# Patient Record
Sex: MALE | Race: OTHER | NOT HISPANIC OR LATINO
[De-identification: names, ages, dates, MRNs, and addresses within clinical notes are randomized per-mention and may not be internally consistent; named-entity substitution may affect disease eponyms.]

---

## 2021-11-30 ENCOUNTER — APPOINTMENT (OUTPATIENT)
Dept: SURGERY | Facility: CLINIC | Age: 61
End: 2021-11-30

## 2022-08-30 ENCOUNTER — APPOINTMENT (OUTPATIENT)
Dept: ORTHOPEDIC SURGERY | Facility: CLINIC | Age: 62
End: 2022-08-30

## 2022-08-30 ENCOUNTER — RESULT CHARGE (OUTPATIENT)
Age: 62
End: 2022-08-30

## 2022-08-30 VITALS — BODY MASS INDEX: 38.8 KG/M2 | WEIGHT: 262 LBS | HEIGHT: 69 IN

## 2022-08-30 DIAGNOSIS — E11.9 TYPE 2 DIABETES MELLITUS W/OUT COMPLICATIONS: ICD-10-CM

## 2022-08-30 DIAGNOSIS — Z78.9 OTHER SPECIFIED HEALTH STATUS: ICD-10-CM

## 2022-08-30 DIAGNOSIS — M70.22 OLECRANON BURSITIS, LEFT ELBOW: ICD-10-CM

## 2022-08-30 DIAGNOSIS — I10 ESSENTIAL (PRIMARY) HYPERTENSION: ICD-10-CM

## 2022-08-30 PROCEDURE — 73080 X-RAY EXAM OF ELBOW: CPT | Mod: LT

## 2022-08-30 PROCEDURE — 99203 OFFICE O/P NEW LOW 30 MIN: CPT

## 2022-08-30 PROCEDURE — ZZZZZ: CPT

## 2022-08-30 NOTE — HISTORY OF PRESENT ILLNESS
[0] : 0 [Full time] : Work status: full time [FreeTextEntry3] : June 2022 [FreeTextEntry5] : Patient had lt shoulder surgery in June 2022. Since then, he has been having swelling in the lt elbow. No pain [de-identified] : 63 y/o RHD male here today for the L shoulder.  He noticed swelling in the elbow about 2 weeks ago.  No injury.  No fevers or chills.  No treatment.  [] : no [FreeTextEntry1] : L elbow [de-identified] : 6/6/22

## 2022-08-30 NOTE — PHYSICAL EXAM
[Left] : left elbow [NL (150)] : flexion 150 degrees [NL (0)] : extension 0 degrees [5___] : extension 5[unfilled]/5 [] : no erythema

## 2022-08-30 NOTE — REVIEW OF SYSTEMS
[Joint Stiffness] : joint stiffness [Joint Swelling] : joint swelling [Joint Pain] : joint pain [Negative] : Heme/Lymph
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
## 2022-08-30 NOTE — HISTORY OF PRESENT ILLNESS
[0] : 0 [Full time] : Work status: full time [FreeTextEntry3] : June 2022 [FreeTextEntry5] : Patient had lt shoulder surgery in June 2022. Since then, he has been having swelling in the lt elbow. No pain [de-identified] : 63 y/o RHD male here today for the L shoulder.  He noticed swelling in the elbow about 2 weeks ago.  No injury.  No fevers or chills.  No treatment.  [] : no [FreeTextEntry1] : L elbow [de-identified] : 6/6/22

## 2022-08-30 NOTE — ASSESSMENT
[FreeTextEntry1] : L olecranon bursitis, non infected. \par Ice.\par Diclofenac BId prn.\par Compressive wrapping. \par RTO 6 weeks prn.

## 2022-12-18 ENCOUNTER — EMERGENCY (EMERGENCY)
Facility: HOSPITAL | Age: 62
LOS: 1 days | Discharge: HOME | End: 2022-12-18
Attending: EMERGENCY MEDICINE
Payer: COMMERCIAL

## 2022-12-18 VITALS
HEIGHT: 69 IN | HEART RATE: 88 BPM | DIASTOLIC BLOOD PRESSURE: 99 MMHG | WEIGHT: 261.91 LBS | OXYGEN SATURATION: 99 % | SYSTOLIC BLOOD PRESSURE: 156 MMHG | RESPIRATION RATE: 20 BRPM | TEMPERATURE: 98 F

## 2022-12-18 PROCEDURE — 93005 ELECTROCARDIOGRAM TRACING: CPT

## 2022-12-18 PROCEDURE — 99284 EMERGENCY DEPT VISIT MOD MDM: CPT

## 2022-12-18 PROCEDURE — 72131 CT LUMBAR SPINE W/O DYE: CPT | Mod: MA

## 2022-12-18 PROCEDURE — 73130 X-RAY EXAM OF HAND: CPT

## 2022-12-18 PROCEDURE — 72131 CT LUMBAR SPINE W/O DYE: CPT | Mod: 26,MA

## 2022-12-18 PROCEDURE — 99285 EMERGENCY DEPT VISIT HI MDM: CPT | Mod: 25

## 2022-12-18 PROCEDURE — 93010 ELECTROCARDIOGRAM REPORT: CPT

## 2022-12-18 PROCEDURE — 73130 X-RAY EXAM OF HAND: CPT | Mod: 26,LT

## 2022-12-18 RX ORDER — METHOCARBAMOL 500 MG/1
2 TABLET, FILM COATED ORAL
Qty: 30 | Refills: 0
Start: 2022-12-18 | End: 2022-12-22

## 2022-12-18 RX ORDER — METHOCARBAMOL 500 MG/1
1500 TABLET, FILM COATED ORAL ONCE
Refills: 0 | Status: COMPLETED | OUTPATIENT
Start: 2022-12-18 | End: 2022-12-18

## 2022-12-18 RX ORDER — IBUPROFEN 200 MG
1 TABLET ORAL
Qty: 20 | Refills: 0
Start: 2022-12-18 | End: 2022-12-22

## 2022-12-18 RX ORDER — IBUPROFEN 200 MG
600 TABLET ORAL ONCE
Refills: 0 | Status: COMPLETED | OUTPATIENT
Start: 2022-12-18 | End: 2022-12-18

## 2022-12-18 RX ADMIN — METHOCARBAMOL 1500 MILLIGRAM(S): 500 TABLET, FILM COATED ORAL at 15:46

## 2022-12-18 RX ADMIN — Medication 600 MILLIGRAM(S): at 15:46

## 2022-12-18 NOTE — ED PROVIDER NOTE - NS ED ATTENDING STATEMENT MOD
This was a shared visit with the DAREN. I reviewed and verified the documentation and independently performed the documented:
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
## 2022-12-18 NOTE — ED PROVIDER NOTE - PATIENT PORTAL LINK FT
You can access the FollowMyHealth Patient Portal offered by [facility] by registering at the following website: [email or web address]. By joining [facility] FollowMyHealth portal, you will also be able to view your health information using other applications (apps) compatible with our system.
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
## 2022-12-18 NOTE — ED PROVIDER NOTE - NSFOLLOWUPCLINICS_GEN_ALL_ED_FT
[facility] - [facility]  Ortho/Spine  [address] [place] [address]  Phone: [phone]  Fax:

## 2022-12-19 NOTE — ED ADULT NURSE REASSESSMENT NOTE - NS ED NURSE REASSESS COMMENT FT1
Assumed care of pt at 19:15 as stated in report from KRISTIN Fenton. Charting as noted. Patient A&O x4, denies pain/discomfort, denies CP/SOB. Updated on the plan of care. Call bell within reach, bed locked in lowest position. No signs of acute distress noted, safety maintained.